# Patient Record
Sex: FEMALE | Race: WHITE | NOT HISPANIC OR LATINO
[De-identification: names, ages, dates, MRNs, and addresses within clinical notes are randomized per-mention and may not be internally consistent; named-entity substitution may affect disease eponyms.]

---

## 2021-06-23 ENCOUNTER — APPOINTMENT (OUTPATIENT)
Dept: PEDIATRIC ORTHOPEDIC SURGERY | Facility: CLINIC | Age: 86
End: 2021-06-23
Payer: MEDICARE

## 2021-06-23 VITALS — HEIGHT: 61 IN | WEIGHT: 108 LBS | BODY MASS INDEX: 20.39 KG/M2 | TEMPERATURE: 97.9 F

## 2021-06-23 PROBLEM — Z00.00 ENCOUNTER FOR PREVENTIVE HEALTH EXAMINATION: Status: ACTIVE | Noted: 2021-06-23

## 2021-06-23 PROCEDURE — 99072 ADDL SUPL MATRL&STAF TM PHE: CPT

## 2021-06-23 PROCEDURE — 99213 OFFICE O/P EST LOW 20 MIN: CPT

## 2021-06-23 RX ORDER — LOSARTAN POTASSIUM 100 MG/1
TABLET, FILM COATED ORAL
Refills: 0 | Status: ACTIVE | COMMUNITY

## 2021-06-23 RX ORDER — LEVOTHYROXINE SODIUM 25 UG/1
25 TABLET ORAL
Refills: 0 | Status: ACTIVE | COMMUNITY

## 2021-06-23 RX ORDER — LORAZEPAM 2 MG/1
TABLET ORAL
Refills: 0 | Status: ACTIVE | COMMUNITY

## 2021-06-23 NOTE — PHYSICAL EXAM
[de-identified] : On physical examination there is crepitus on range of motion of each knee.  The range of motion is from -15 degrees of full extension to 115 degrees of flexion bilaterally.  There is no effusion in either knee there is medial and lateral joint line tenderness bilaterally.

## 2021-06-23 NOTE — HISTORY OF PRESENT ILLNESS
[de-identified] : This 89-year-old female is here for marked increase in bilateral knee pain.  There has been no history of recent injury.  She is hoping to undergo Synvisc injections into each knee.

## 2021-06-23 NOTE — ASSESSMENT
[FreeTextEntry1] : DJD right and left knees\par \par We will be seeking authorization for Synvisc injections for each knee.  She will return when the medication is obtained.

## 2021-06-25 RX ORDER — HYALURONATE SODIUM 30 MG/2 ML
30 SYRINGE (ML) INTRAARTICULAR WEEKLY
Qty: 3 | Refills: 0 | Status: ACTIVE | COMMUNITY
Start: 2021-06-25 | End: 1900-01-01

## 2021-11-29 ENCOUNTER — APPOINTMENT (OUTPATIENT)
Dept: PEDIATRIC ORTHOPEDIC SURGERY | Facility: CLINIC | Age: 86
End: 2021-11-29
Payer: MEDICARE

## 2021-11-29 VITALS — BODY MASS INDEX: 20.39 KG/M2 | WEIGHT: 108 LBS | HEIGHT: 61 IN

## 2021-11-29 PROCEDURE — 99213 OFFICE O/P EST LOW 20 MIN: CPT

## 2021-11-30 NOTE — HISTORY OF PRESENT ILLNESS
[de-identified] : This 89-year-old female returns for reevaluation of DJD of right and left knees as well as cellulitis of the right lower leg.  She has been given an antibiotic cream for the cellulitis.  She is reluctant to consider cortisone injection at this time.  She thinks the physical therapy she is receiving is aggravating the pain in both knees.

## 2021-11-30 NOTE — ASSESSMENT
[FreeTextEntry1] : DJD right and left knees\par Cellulitis right lower leg\par \par The patient will stop physical therapy at this time.  She will continue taking her medication.  She may return for cortisone injection.

## 2021-11-30 NOTE — PHYSICAL EXAM
[de-identified] : On physical examination there is crepitus on range of motion of each knee.  Patient lacks 10 degrees of full extension of the knees bilaterally.  There is no effusion in either knee and no varus valgus or AP instability.  Attempts at flexion past 110 degrees increases her pain significantly.  It is also noted that the patient has early cellulitis of the lower leg on the right.

## 2021-12-07 ENCOUNTER — APPOINTMENT (OUTPATIENT)
Dept: PEDIATRIC ORTHOPEDIC SURGERY | Facility: CLINIC | Age: 86
End: 2021-12-07
Payer: MEDICARE

## 2021-12-07 VITALS — HEIGHT: 61 IN | WEIGHT: 108 LBS | BODY MASS INDEX: 20.39 KG/M2

## 2021-12-07 DIAGNOSIS — L03.115 CELLULITIS OF RIGHT LOWER LIMB: ICD-10-CM

## 2021-12-07 PROCEDURE — 99213 OFFICE O/P EST LOW 20 MIN: CPT

## 2021-12-08 PROBLEM — L03.115 CELLULITIS OF RIGHT LOWER LEG: Status: ACTIVE | Noted: 2021-11-30

## 2021-12-08 NOTE — HISTORY OF PRESENT ILLNESS
[de-identified] : This 89-year-old female returns for reevaluation of DJD of right and left knees.  She states that the recent period of rest that she has had has made her feel significantly better.  She is able to ambulate more easily.  She states that the cellulitis looks and feels better.

## 2021-12-08 NOTE — ASSESSMENT
[FreeTextEntry1] : DJD right and left knees\par \par I advised the patient to return to physical therapy with a modified plan which she has been given a prescription for.\par \par Encounter time: 20 minutes

## 2021-12-08 NOTE — PHYSICAL EXAM
[de-identified] : On physical examination the patient has much improved range of motion of the knees.  The cellulitis is gone.  There is no effusion in either knee and no varus valgus or AP instability.  She does have crepitus on range of motion.

## 2022-03-22 RX ORDER — HYLAN G-F 20 16MG/2ML
16 SYRINGE (ML) INTRAARTICULAR
Qty: 3 | Refills: 0 | Status: ACTIVE | COMMUNITY
Start: 2022-03-22 | End: 1900-01-01

## 2022-03-24 RX ORDER — HYALURONATE SODIUM 30 MG/2 ML
30 SYRINGE (ML) INTRAARTICULAR
Qty: 6 | Refills: 0 | Status: ACTIVE | COMMUNITY
Start: 2022-03-24 | End: 1900-01-01

## 2022-03-25 ENCOUNTER — APPOINTMENT (OUTPATIENT)
Dept: PEDIATRIC ORTHOPEDIC SURGERY | Facility: CLINIC | Age: 87
End: 2022-03-25

## 2022-04-12 ENCOUNTER — APPOINTMENT (OUTPATIENT)
Dept: PEDIATRIC ORTHOPEDIC SURGERY | Facility: CLINIC | Age: 87
End: 2022-04-12
Payer: MEDICARE

## 2022-04-14 ENCOUNTER — APPOINTMENT (OUTPATIENT)
Dept: PEDIATRIC ORTHOPEDIC SURGERY | Facility: CLINIC | Age: 87
End: 2022-04-14
Payer: MEDICARE

## 2022-04-14 VITALS — WEIGHT: 108 LBS | HEIGHT: 61 IN | BODY MASS INDEX: 20.39 KG/M2

## 2022-04-14 PROCEDURE — 20610 DRAIN/INJ JOINT/BURSA W/O US: CPT | Mod: 50

## 2022-04-14 NOTE — ASSESSMENT
[FreeTextEntry1] : DJD right and left knees\par \par Patient will return in 1 week for reinjection with Orthovisc into each knee.\par \par Encounter time: 15 minutes

## 2022-04-14 NOTE — PHYSICAL EXAM
[FreeTextEntry1] : On physical examination patient has crepitus on range of motion of each knee.  There is no effusion in either knee.  There is no varus valgus or AP instability.  Range of motion of each knee is from -5 degrees of full extension to 110 degrees of flexion.

## 2022-04-14 NOTE — HISTORY OF PRESENT ILLNESS
[FreeTextEntry1] : This 89-year-old female returns for reevaluation of DJD of right and left knees.  She is here for Orthovisc injections into each knee.

## 2022-04-21 ENCOUNTER — APPOINTMENT (OUTPATIENT)
Dept: PEDIATRIC ORTHOPEDIC SURGERY | Facility: CLINIC | Age: 87
End: 2022-04-21
Payer: MEDICARE

## 2022-04-21 VITALS — BODY MASS INDEX: 20.39 KG/M2 | HEIGHT: 61 IN | WEIGHT: 108 LBS | TEMPERATURE: 96.9 F

## 2022-04-21 PROCEDURE — 20610 DRAIN/INJ JOINT/BURSA W/O US: CPT | Mod: 50

## 2022-04-23 NOTE — PROCEDURE
Please ignore the call message. 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    [de-identified] : The right and left knees were injected with 2 mL of Orthovisc without adverse reaction

## 2022-04-23 NOTE — HISTORY OF PRESENT ILLNESS
[FreeTextEntry1] : This 89-year-old female returns for reevaluation of DJD of right and left knees.  She is here for Orthovisc injection into each knee.

## 2022-04-23 NOTE — PROCEDURE
[de-identified] : The right and left knees were injected with 2 mL of Orthovisc without adverse reaction

## 2022-04-23 NOTE — ASSESSMENT
[FreeTextEntry1] : DJD right and left knees\par \par The patient will return in 1 week for reinjection of Orthovisc into each knee.\par \par Encounter time: 15 minutes

## 2022-04-23 NOTE — PHYSICAL EXAM
[FreeTextEntry1] : On physical examination there is crepitus on range of motion of each knee.  Range of motion is from -10 degrees of full extension to 100 degrees of flexion.  There is no effusion and no varus valgus or AP instability.

## 2022-04-28 ENCOUNTER — APPOINTMENT (OUTPATIENT)
Dept: PEDIATRIC ORTHOPEDIC SURGERY | Facility: CLINIC | Age: 87
End: 2022-04-28
Payer: MEDICARE

## 2022-04-28 VITALS — HEIGHT: 61 IN | BODY MASS INDEX: 20.39 KG/M2 | WEIGHT: 108 LBS

## 2022-04-28 PROCEDURE — 20610 DRAIN/INJ JOINT/BURSA W/O US: CPT | Mod: 50

## 2022-04-28 NOTE — ASSESSMENT
[FreeTextEntry1] : DJD right and left knees\par \par The patient will continue symptomatic treatment and will return on a as needed basis.\par \par Encounter time: 15 minutes

## 2022-04-28 NOTE — HISTORY OF PRESENT ILLNESS
[FreeTextEntry1] : This 89-year-old female returns for reevaluation of DJD of her right and left knees.  She is here for her third Orthovisc injection into each knee.

## 2022-04-28 NOTE — PHYSICAL EXAM
[FreeTextEntry1] : On physical examination she has crepitus on range of motion of each knee.  Range of motion of each knee is from -5 degrees of full extension to 110 degrees of flexion.  There is no effusion in either knee no varus valgus or AP instability.

## 2023-06-20 ENCOUNTER — APPOINTMENT (OUTPATIENT)
Dept: PEDIATRIC ORTHOPEDIC SURGERY | Facility: CLINIC | Age: 88
End: 2023-06-20

## 2023-06-27 ENCOUNTER — APPOINTMENT (OUTPATIENT)
Dept: PEDIATRIC ORTHOPEDIC SURGERY | Facility: CLINIC | Age: 88
End: 2023-06-27
Payer: MEDICARE

## 2023-06-27 VITALS — WEIGHT: 108 LBS | HEIGHT: 61 IN | TEMPERATURE: 97 F | BODY MASS INDEX: 20.39 KG/M2

## 2023-06-27 PROCEDURE — 99214 OFFICE O/P EST MOD 30 MIN: CPT | Mod: 25

## 2023-06-27 PROCEDURE — 20610 DRAIN/INJ JOINT/BURSA W/O US: CPT

## 2023-07-02 NOTE — ASSESSMENT
[FreeTextEntry1] : DJD left knee\par \par The patient will return in 2 weeks possibly for injection into the right knee.

## 2023-07-02 NOTE — PROCEDURE
[de-identified] : 1 mL of 40 mg of Depo-Medrol and 2 mils of 1% plain lidocaine have been injected into the left knee without adverse reaction.

## 2023-07-02 NOTE — PHYSICAL EXAM
[de-identified] : On physical examination the patient has a full range of motion of the left hip ankle and subtalar joints.  Examination of the left knee reveals crepitus on range of motion.  There is mild swelling diffusely but no effusion.  There is no varus valgus or AP instability.

## 2023-07-02 NOTE — HISTORY OF PRESENT ILLNESS
[de-identified] : This 89-year-old female returns for reevaluation of DJD of the left knee.  She is having significant pain in the left knee with some swelling causing difficulty with activities of daily living and ambulation.

## 2023-07-12 ENCOUNTER — APPOINTMENT (OUTPATIENT)
Dept: PEDIATRIC ORTHOPEDIC SURGERY | Facility: CLINIC | Age: 88
End: 2023-07-12
Payer: MEDICARE

## 2023-07-12 VITALS — HEIGHT: 61 IN | WEIGHT: 108 LBS | BODY MASS INDEX: 20.39 KG/M2 | TEMPERATURE: 97 F

## 2023-07-12 PROCEDURE — 20610 DRAIN/INJ JOINT/BURSA W/O US: CPT | Mod: RT

## 2023-07-12 NOTE — PROCEDURE
[FreeTextEntry1] : Under sterile technique with a Betadine prep the right knee was injected from a medial portal with 40 mg of methylprednisolone and 2 cc of 1% lidocaine with a Band-Aid dressing applied at the conclusion.  This was tolerated without incident

## 2023-07-12 NOTE — ASSESSMENT
[FreeTextEntry1] : Impression: Symptomatic arthritis both knees right greater than left.\par \par The more symptomatic right knee was injected with methylprednisolone and lidocaine.

## 2023-07-12 NOTE — PHYSICAL EXAM
[de-identified] : Her exam reveals an antalgic gait she has a moderate effusion to the right knee with crepitus on motion slight restriction of full flexion no instability on stress pain mainly in the medial compartment.  Popliteal fossa calf neuro vas exam are negative Head atraumatic, normal cephalic shape.

## 2023-08-08 ENCOUNTER — APPOINTMENT (OUTPATIENT)
Dept: PEDIATRIC ORTHOPEDIC SURGERY | Facility: CLINIC | Age: 88
End: 2023-08-08
Payer: MEDICARE

## 2023-08-08 VITALS
WEIGHT: 108 LBS | BODY MASS INDEX: 20.39 KG/M2 | DIASTOLIC BLOOD PRESSURE: 97 MMHG | HEIGHT: 61 IN | SYSTOLIC BLOOD PRESSURE: 145 MMHG | TEMPERATURE: 97.6 F

## 2023-08-08 DIAGNOSIS — M17.0 BILATERAL PRIMARY OSTEOARTHRITIS OF KNEE: ICD-10-CM

## 2023-08-08 PROCEDURE — 20610 DRAIN/INJ JOINT/BURSA W/O US: CPT | Mod: LT

## 2023-08-08 NOTE — PHYSICAL EXAM
[de-identified] : Her exam reveals crepitus on motion good motion no erythema or increased warmth to suggest infection she ambulates with her walker slowly with an antalgic gait.  Popliteal fossa calf neurovascular exam are negative.

## 2023-08-08 NOTE — HISTORY OF PRESENT ILLNESS
[de-identified] : This 89-year-old returns today as she is having recurrent pain to her knees left greater than right.  She is requesting a repeat steroid injection.

## 2023-08-08 NOTE — ASSESSMENT
[FreeTextEntry1] : Impression: Symptomatic arthritis both knees left greater than right.  The left knee was injected uneventfully